# Patient Record
Sex: FEMALE | Race: WHITE
[De-identification: names, ages, dates, MRNs, and addresses within clinical notes are randomized per-mention and may not be internally consistent; named-entity substitution may affect disease eponyms.]

---

## 2018-05-31 ENCOUNTER — HOSPITAL ENCOUNTER (EMERGENCY)
Dept: HOSPITAL 25 - ED | Age: 63
Discharge: HOME | End: 2018-05-31
Payer: COMMERCIAL

## 2018-05-31 VITALS — DIASTOLIC BLOOD PRESSURE: 74 MMHG | SYSTOLIC BLOOD PRESSURE: 127 MMHG

## 2018-05-31 DIAGNOSIS — J45.909: ICD-10-CM

## 2018-05-31 DIAGNOSIS — E07.9: ICD-10-CM

## 2018-05-31 DIAGNOSIS — C91.10: ICD-10-CM

## 2018-05-31 DIAGNOSIS — R51: Primary | ICD-10-CM

## 2018-05-31 LAB
BASOPHILS # BLD AUTO: 0 10^3/UL (ref 0–0.2)
EOSINOPHIL # BLD AUTO: 0.3 10^3/UL (ref 0–0.6)
HCT VFR BLD AUTO: 34 % (ref 35–47)
HGB BLD-MCNC: 11.6 G/DL (ref 12–16)
INR PPP/BLD: 1.05 (ref 0.77–1.02)
LYMPHOCYTES # BLD AUTO: 2 10^3/UL (ref 1–4.8)
MCH RBC QN AUTO: 31 PG (ref 27–31)
MCHC RBC AUTO-ENTMCNC: 34 G/DL (ref 31–36)
MCV RBC AUTO: 89 FL (ref 80–97)
MONOCYTES # BLD AUTO: 0.8 10^3/UL (ref 0–0.8)
NEUTROPHILS # BLD AUTO: 4.3 10^3/UL (ref 1.5–7.7)
NRBC # BLD AUTO: 0 10^3/UL
NRBC BLD QL AUTO: 0.1
PLATELET # BLD AUTO: 202 10^3/UL (ref 150–450)
RBC # BLD AUTO: 3.79 10^6/UL (ref 4–5.4)
WBC # BLD AUTO: 7.4 10^3/UL (ref 3.5–10.8)

## 2018-05-31 PROCEDURE — 36415 COLL VENOUS BLD VENIPUNCTURE: CPT

## 2018-05-31 PROCEDURE — 83605 ASSAY OF LACTIC ACID: CPT

## 2018-05-31 PROCEDURE — 85025 COMPLETE CBC W/AUTO DIFF WBC: CPT

## 2018-05-31 PROCEDURE — 99282 EMERGENCY DEPT VISIT SF MDM: CPT

## 2018-05-31 PROCEDURE — 86140 C-REACTIVE PROTEIN: CPT

## 2018-05-31 PROCEDURE — 85610 PROTHROMBIN TIME: CPT

## 2018-05-31 PROCEDURE — 87040 BLOOD CULTURE FOR BACTERIA: CPT

## 2018-05-31 PROCEDURE — 81015 MICROSCOPIC EXAM OF URINE: CPT

## 2018-05-31 PROCEDURE — 80053 COMPREHEN METABOLIC PANEL: CPT

## 2018-05-31 PROCEDURE — 81003 URINALYSIS AUTO W/O SCOPE: CPT

## 2018-05-31 NOTE — ED
Jeffrey LINDA Tiffany, scribed for Herbert Padilla MD on 05/31/18 at 0207 .





Complex/Multi-Sys Presentation





- HPI Summary


HPI Summary: 


63 y/o F [place] [facility] complains of fever since five nights ago, worse 

since two hours ago. States that fever only comes at night, lasting for one 

hour. Symptoms aggravated by nothing. Symptoms alleviated by nothing. Reports 

headache. Denies cough, nasal congestion, shortness of breath.





Pt diagnosed with CLL 6 years ago, began chemo treatment 2 weeks ago. Pt spoke 

with oncology on call two hours ago, who referred her to ER because pt had 105 

temp.





- History Of Current Complaint


Chief Complaint: EDFever


Time Seen by Provider: 05/31/18 01:44


Hx Obtained From: Patient


Onset/Duration: Lasting Days - five nights ago, Still Present, Worse Since - 

two hours ago


Timing: Hours - For one hour, only at night


Aggravating Factor(s): Nothing


Alleviating Factor(s): Nothing


Associated Signs And Symptoms: Positive: Other - Headache; NEGATIVE: cough, 

nasal congestion, shortness of breath.





- Allergies/Home Medications


Allergies/Adverse Reactions: 


 Allergies











Allergy/AdvReac Type Severity Reaction Status Date / Time


 


Rats and Guinea Pigs Allergy  Wheezing Uncoded 05/08/17 11:28














PMH/Surg Hx/FS Hx/Imm Hx


Previously Healthy: No


Endocrine/Hematology History: Reports: Hx Thyroid Disease


   Denies: Hx Diabetes


Cardiovascular History: 


   Denies: Hx Hypertension


Respiratory History: Reports: Hx Asthma - Allergic Asthma


   Denies: Hx Chronic Obstructive Pulmonary Disease (COPD)


GI History: 


   Denies: Hx Ulcer


 History: 


   Denies: Hx Renal Disease


Musculoskeletal History: Reports: Hx Osteoporosis - OSTEOPENIA





- Cancer History


Cancer Type, Location and Year: CLL


Hx Chemotherapy: No


Hx Radiation Therapy: No





- Surgical History


Surgery Procedure, Year, and Place: LEFT KNEE, TRIGGER FINGER, TONSILLECTOMY


Infectious Disease History: No


Infectious Disease History: 


   Denies: Hx Hepatitis, Hx Human Immunodeficiency Virus (HIV), History Other 

Infectious Disease, Traveled Outside the [place] in Last 30 Days





- Family History


Known Family History: 


   Negative: Renal Disease





- Social History


Alcohol Use: None


Hx Substance Use: No


Substance Use Type: Reports: None


Hx Tobacco Use: No


Smoking Status (MU): Never Smoked Tobacco





Review of Systems


Positive: Fever


Positive: Other - NEGATIVE: nasal congestion


Negative: Shortness Of Breath, Cough


Positive: Headache


All Other Systems Reviewed And Are Negative: Yes





Physical Exam





- Summary


Physical Exam Summary: 


VITAL SIGNS: Reviewed.


GENERAL:  Patient is a well-developed and nourished female who is lying 

comfortable in the stretcher. Patient is not in any acute respiratory distress.


HEAD AND FACE: No signs of trauma. No ecchymosis, hematomas or skull 

depressions. No sinus tenderness.


EYES: PERRLA, EOMI x 2, No injected conjunctiva, no nystagmus.


EARS: Hearing grossly intact. Ear canals and tympanic membranes are within 

normal limits.


MOUTH: Oropharynx within normal limits.


NECK: Supple, trachea is midline, no adenopathy, no JVD, no carotid bruit, no c-

spine tenderness, neck with full ROM.


CHEST: Symmetric, no tenderness at palpation


LUNGS: Clear to auscultation bilaterally. No wheezing or crackles.


CVS: Regular rate and rhythm, S1 and S2 present, no murmurs or gallops 

appreciated.


ABDOMEN: Soft, non-tender. No signs of distention. No rebound no guarding, and 

no masses palpated. Bowel sounds are normal.


EXTREMITIES: FROM in all major joints, no edema, no cyanosis or clubbing.


NEURO: Alert and oriented x 3. No acute neurological deficits. Speech is normal 

and follows commands.


SKIN: Dry and warm


Triage Information Reviewed: Yes


Vital Signs On Initial Exam: 


 Initial Vitals











Temp Pulse Resp BP Pulse Ox


 


 98.9 F   88   16   136/77   99 


 


 05/31/18 01:06  05/31/18 01:06  05/31/18 01:06  05/31/18 01:06  05/31/18 01:06











Vital Signs Reviewed: Yes





Diagnostics





- Vital Signs


 Vital Signs











  Temp Pulse Resp BP Pulse Ox


 


 05/31/18 01:54  98.6 F    


 


 05/31/18 01:45   80    98


 


 05/31/18 01:44   81   135/82  97


 


 05/31/18 01:06  98.9 F  88  16  136/77  99














- Laboratory


Result Diagrams: 


 05/31/18 02:13





 05/31/18 02:13


Lab Statement: Any lab studies that have been ordered have been reviewed, and 

results considered in the medical decision making process.





Re-Evaluation





- Re-Evaluation


  ** First Eval


Re-Evaluation Time: 03:12


Change: Improved


Comment: Patient is feeling better, agreeable to discharge. Will follow up with 

Dr. Perez today.





Complex Multi-Symp Course/Dx


Course Of Treatment: 63 y/o F presenting to [facility] complains of fever since five 

nights ago, worse since two hours ago. Pt refuses CXR. Bloodwork/UA obtained. 

Pt will be discharged with follow up from Dr. Perez, her oncologist.





- Diagnoses


Provider Diagnoses: 


 Fever








Discharge





- Sign-Out/Discharge


Documenting (check all that apply): Discharge/Admit/Transfer





- Discharge Plan


Condition: Stable


Disposition: HOME


Patient Education Materials:  Fever in Adults (ED)


Referrals: 


Ruthie Redman MD [Primary Care Provider] - 


Valeriano Perez MD [Medical Doctor] - 1 Day


Additional Instructions: 


Follow up with Dr. Perez today, 05/31/2018. Return to the Emergency Department 

with any new or worsening symptoms.





The documentation as recorded by the Jeffrey arellano Tiffany accurately reflects 

the service I personally performed and the decisions made by me, Herbert Padilla MD.

## 2019-10-18 NOTE — HP
DATE OF ADMISSION:  10/25/2019 - [facility]

 

DATE OF OFFICE VISIT/ENCOUNTER:  09/30/2019.

 

ATTENDING PHYSICIAN:  Dr. Florecita French * (dictated by ELMO Cardona).

 

PROCEDURE:  Left middle finger trigger finger release.

 

HISTORY OF PRESENT ILLNESS:  This is a 63-year-old female who complains of 
triggering of her left middle finger.  This has been ongoing since the end of 
2018. She has had cortisone injections in the past which were helpful, but the 
symptoms always returned.  She denies any specific injury.  She would like to 
proceed with surgical intervention at this time.

 

PAST MEDICAL HISTORY:

1.  Chronic leukocytic leukemia, currently in remission, followed by Dr. Perez.

2.  Hypothyroidism.

 

PAST SURGICAL HISTORY:

1.  Left knee arthroscopy.

2.  Tonsillectomy.

3.  Trigger finger releases both hands.

 

CURRENT MEDICATIONS:

1.  Align 4 mg daily.

2.  Calcium 500/vitamin D one tab daily.

3.  Flax seed oil 1,000 mg twice daily.

4.  Iron 325 mg daily.

5.  Magnesium 250 mg daily.

6.  Multivitamin daily.

7.  Proventil HFA inhaler four times a day prn.

8.  Thyroxine 50 mcg daily.

9.  Vitamin B complex daily.

10. Vitamin C 500 mg daily.

11. Vitamin D3 400 units two tabs daily.

 

ALLERGIES:  No known drug allergies.

 

FAMILY HISTORY:  Heart disease and cancer.

 

SOCIAL HISTORY:  The patient is a .  She works at the diagnostic 
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  She denies tobacco use or recreational drug use, and she does 
not drink alcohol.

 

REVIEW OF SYSTEMS:  Negative for general, cephalic, cardiovascular, respiratory
, GI, , other musculoskeletal, integumentary, endocrine, neurologic, and 
hematologic symptoms.  Infectious Disease:  Negative for MRSA, hepatitis C, HIV.

 

                               PHYSICAL EXAMINATION

 

GENERAL:  Well-developed, well-nourished, 63-year-old female in no acute 
distress.

 

VITAL SIGNS:  Height 4'9-1/2", weight 115 pounds.  Pulse rate 74, blood 
pressure 112/68.

 

HEENT:  Normocephalic, atraumatic.  Pupils are equal, round, and reactive to 
light and accommodation.  Extraocular movements are intact.  Throat is clear.

 

NECK:  Supple, no palpable lymph nodes.

 

CARDIOVASCULAR:  Regular rate and rhythm, S1, S2.  No murmurs, rubs or gallops.
  No edema.

 

PULMONARY:  Lungs are clear to auscultation bilaterally.  No wheezes, rales or 
rhonchi.

 

ABDOMEN:  Positive bowel sounds, soft, nontender.

 

MUSCULOSKELETAL:  On exam of her left middle finger, she has tenderness to 
palpation at the A1 pulley.  There is no visible swelling.  She has trouble 
making a full tight fist, but has good extension.  Neurovascular function is 
intact.

 

NEUROLOGIC:  Alert and oriented times three.  Cranial nerves II through XII are 
intact.  Sensation is intact to light touch.

 

IMPRESSION:  Left middle finger trigger finger.

 

PLAN:  The patient is scheduled to undergo a left middle finger trigger finger 
release with Dr. French on 10/25/2019.  She will return to the office ten days 
postop for follow-up and suture removal.  She will plan on using Advil for 
postoperative pain management.

 

 ____________________________________ ELMO CARDONA

 

[id number]/[facility] #: [id number]

KAMILA

## 2019-10-25 ENCOUNTER — HOSPITAL ENCOUNTER (OUTPATIENT)
Dept: HOSPITAL 25 - OREAST | Age: 64
Discharge: HOME | End: 2019-10-25
Attending: ORTHOPAEDIC SURGERY
Payer: COMMERCIAL

## 2019-10-25 VITALS — DIASTOLIC BLOOD PRESSURE: 71 MMHG | SYSTOLIC BLOOD PRESSURE: 135 MMHG

## 2019-10-25 DIAGNOSIS — M65.332: Primary | ICD-10-CM

## 2019-10-25 DIAGNOSIS — E03.9: ICD-10-CM

## 2019-10-25 DIAGNOSIS — C91.11: ICD-10-CM

## 2019-10-25 NOTE — OP
DATE OF OPERATION:  10/25/19 [facility]

 

DATE OF BIRTH:  [birth year]

 

SURGEON:  Florecita French MD

 

ASSISTANT:  ELMO Cardona

 

ANESTHESIA:  Straight local.

 

PRE-OP DIAGNOSIS:  Left middle finger trigger finger.

 

POST-OP DIAGNOSIS:  Left middle finger trigger finger.

 

OPERATIVE PROCEDURE:  Left middle finger trigger release.

 

ESTIMATED BLOOD LOSS:  Zero.

 

TOURNIQUET TIME:  Less than 10 minutes.

 

INDICATIONS FOR PROCEDURE:  Hillary is a 63-year-old female who has triggering and 
locking of the left middle finger.  She has failed conservative treatment.  She 
presents for left trigger finger release, middle finger.

 

DESCRIPTION OF PROCEDURE:  The patient was brought to the operating room, was 
given a local anesthetic with 10 cc of 1% plain lidocaine.  Skin of her left 
hand and forearm was prepped and draped in the usual sterile fashion.  The hand 
and forearm were exsanguinated and the tourniquet elevated to 250 mmHg.  A 
transverse incision was made centered over the A1 pulley of the middle finger.  
We dissected through the subcutaneous tissue down to the A1 pulley.  The pulley 
was incised longitudinally completely releasing the flexor tendons which had no 
abrasion or synovitis.  The patient was able to flex and extend her finger and 
there was no triggering or locking.  The wound was irrigated and the skin edges 
were reapproximated with 4-0 nylon suture.  The wound was dressed with Xeroform
, 4x4, Kerlix, and Coban.  The patient tolerated the procedure well and was 
brought to the recovery room in good condition.

 

 [id number]/[facility] #: [id number]

[id number]